# Patient Record
Sex: MALE | Race: WHITE | NOT HISPANIC OR LATINO | Employment: UNEMPLOYED | URBAN - METROPOLITAN AREA
[De-identification: names, ages, dates, MRNs, and addresses within clinical notes are randomized per-mention and may not be internally consistent; named-entity substitution may affect disease eponyms.]

---

## 2018-02-15 ENCOUNTER — HOSPITAL ENCOUNTER (EMERGENCY)
Facility: HOSPITAL | Age: 28
Discharge: HOME | End: 2018-02-15
Attending: EMERGENCY MEDICINE | Admitting: EMERGENCY MEDICINE
Payer: COMMERCIAL

## 2018-02-15 ENCOUNTER — APPOINTMENT (EMERGENCY)
Dept: CT IMAGING | Facility: HOSPITAL | Age: 28
End: 2018-02-15
Payer: COMMERCIAL

## 2018-02-15 VITALS
DIASTOLIC BLOOD PRESSURE: 64 MMHG | RESPIRATION RATE: 20 BRPM | HEART RATE: 103 BPM | TEMPERATURE: 97.5 F | WEIGHT: 195 LBS | SYSTOLIC BLOOD PRESSURE: 126 MMHG | OXYGEN SATURATION: 100 %

## 2018-02-15 DIAGNOSIS — K59.03 DRUG-INDUCED CONSTIPATION: Primary | ICD-10-CM

## 2018-02-15 LAB
ALBUMIN SERPL BCP-MCNC: 4.6 G/DL (ref 3.5–5)
ALP SERPL-CCNC: 78 U/L (ref 46–116)
ALT SERPL W P-5'-P-CCNC: 45 U/L (ref 12–78)
AMPHETAMINES SERPL QL SCN: NEGATIVE
ANION GAP SERPL CALCULATED.3IONS-SCNC: 11 MMOL/L (ref 4–13)
AST SERPL W P-5'-P-CCNC: 30 U/L (ref 5–45)
BACTERIA UR QL AUTO: ABNORMAL /HPF
BARBITURATES UR QL: NEGATIVE
BASOPHILS # BLD AUTO: 0.04 THOUSANDS/ΜL (ref 0–0.1)
BASOPHILS NFR BLD AUTO: 0 % (ref 0–1)
BENZODIAZ UR QL: NEGATIVE
BILIRUB SERPL-MCNC: 0.6 MG/DL (ref 0.2–1)
BILIRUB UR QL STRIP: NEGATIVE
BUN SERPL-MCNC: 18 MG/DL (ref 5–25)
CALCIUM SERPL-MCNC: 9.3 MG/DL (ref 8.3–10.1)
CHLORIDE SERPL-SCNC: 103 MMOL/L (ref 100–108)
CLARITY UR: CLEAR
CO2 SERPL-SCNC: 27 MMOL/L (ref 21–32)
COCAINE UR QL: NEGATIVE
COLOR UR: YELLOW
CREAT SERPL-MCNC: 1.17 MG/DL (ref 0.6–1.3)
EOSINOPHIL # BLD AUTO: 0.31 THOUSAND/ΜL (ref 0–0.61)
EOSINOPHIL NFR BLD AUTO: 2 % (ref 0–6)
ERYTHROCYTE [DISTWIDTH] IN BLOOD BY AUTOMATED COUNT: 13.4 % (ref 11.6–15.1)
GFR SERPL CREATININE-BSD FRML MDRD: 84 ML/MIN/1.73SQ M
GLUCOSE SERPL-MCNC: 88 MG/DL (ref 65–140)
GLUCOSE UR STRIP-MCNC: NEGATIVE MG/DL
HCT VFR BLD AUTO: 34.7 % (ref 36.5–49.3)
HGB BLD-MCNC: 11.9 G/DL (ref 12–17)
HGB UR QL STRIP.AUTO: ABNORMAL
KETONES UR STRIP-MCNC: NEGATIVE MG/DL
LEUKOCYTE ESTERASE UR QL STRIP: ABNORMAL
LIPASE SERPL-CCNC: 89 U/L (ref 73–393)
LYMPHOCYTES # BLD AUTO: 5.22 THOUSANDS/ΜL (ref 0.6–4.47)
LYMPHOCYTES NFR BLD AUTO: 32 % (ref 14–44)
MCH RBC QN AUTO: 30.7 PG (ref 26.8–34.3)
MCHC RBC AUTO-ENTMCNC: 34.3 G/DL (ref 31.4–37.4)
MCV RBC AUTO: 90 FL (ref 82–98)
METHADONE UR QL: POSITIVE
MONOCYTES # BLD AUTO: 1.52 THOUSAND/ΜL (ref 0.17–1.22)
MONOCYTES NFR BLD AUTO: 9 % (ref 4–12)
NEUTROPHILS # BLD AUTO: 9.27 THOUSANDS/ΜL (ref 1.85–7.62)
NEUTS SEG NFR BLD AUTO: 57 % (ref 43–75)
NITRITE UR QL STRIP: NEGATIVE
NON-SQ EPI CELLS URNS QL MICRO: ABNORMAL /HPF
OPIATES UR QL SCN: NEGATIVE
PCP UR QL: NEGATIVE
PH UR STRIP.AUTO: 6 [PH] (ref 4.5–8)
PLATELET # BLD AUTO: 351 THOUSANDS/UL (ref 149–390)
PMV BLD AUTO: 9.3 FL (ref 8.9–12.7)
POTASSIUM SERPL-SCNC: 3.9 MMOL/L (ref 3.5–5.3)
PROT SERPL-MCNC: 7.7 G/DL (ref 6.4–8.2)
PROT UR STRIP-MCNC: ABNORMAL MG/DL
RBC # BLD AUTO: 3.87 MILLION/UL (ref 3.88–5.62)
RBC #/AREA URNS AUTO: ABNORMAL /HPF
SODIUM SERPL-SCNC: 141 MMOL/L (ref 136–145)
SP GR UR STRIP.AUTO: 1.02 (ref 1–1.03)
THC UR QL: NEGATIVE
UROBILINOGEN UR QL STRIP.AUTO: 0.2 E.U./DL
WBC # BLD AUTO: 16.36 THOUSAND/UL (ref 4.31–10.16)
WBC #/AREA URNS AUTO: ABNORMAL /HPF

## 2018-02-15 PROCEDURE — 96374 THER/PROPH/DIAG INJ IV PUSH: CPT

## 2018-02-15 PROCEDURE — 36415 COLL VENOUS BLD VENIPUNCTURE: CPT | Performed by: EMERGENCY MEDICINE

## 2018-02-15 PROCEDURE — 99284 EMERGENCY DEPT VISIT MOD MDM: CPT

## 2018-02-15 PROCEDURE — 74177 CT ABD & PELVIS W/CONTRAST: CPT

## 2018-02-15 PROCEDURE — 80053 COMPREHEN METABOLIC PANEL: CPT | Performed by: EMERGENCY MEDICINE

## 2018-02-15 PROCEDURE — 83690 ASSAY OF LIPASE: CPT | Performed by: EMERGENCY MEDICINE

## 2018-02-15 PROCEDURE — 81001 URINALYSIS AUTO W/SCOPE: CPT

## 2018-02-15 PROCEDURE — 80307 DRUG TEST PRSMV CHEM ANLYZR: CPT | Performed by: EMERGENCY MEDICINE

## 2018-02-15 PROCEDURE — 85025 COMPLETE CBC W/AUTO DIFF WBC: CPT | Performed by: EMERGENCY MEDICINE

## 2018-02-15 PROCEDURE — 96361 HYDRATE IV INFUSION ADD-ON: CPT

## 2018-02-15 RX ORDER — DIAZEPAM 5 MG/ML
5 INJECTION, SOLUTION INTRAMUSCULAR; INTRAVENOUS ONCE
Status: COMPLETED | OUTPATIENT
Start: 2018-02-15 | End: 2018-02-15

## 2018-02-15 RX ORDER — DICYCLOMINE HCL 20 MG
20 TABLET ORAL ONCE
Status: COMPLETED | OUTPATIENT
Start: 2018-02-15 | End: 2018-02-15

## 2018-02-15 RX ADMIN — DIAZEPAM 5 MG: 5 INJECTION, SOLUTION INTRAMUSCULAR; INTRAVENOUS at 18:25

## 2018-02-15 RX ADMIN — DICYCLOMINE HYDROCHLORIDE 20 MG: 20 TABLET ORAL at 18:25

## 2018-02-15 RX ADMIN — SODIUM CHLORIDE 1000 ML: 0.9 INJECTION, SOLUTION INTRAVENOUS at 18:25

## 2018-02-15 RX ADMIN — IOHEXOL 100 ML: 350 INJECTION, SOLUTION INTRAVENOUS at 19:21

## 2018-02-15 NOTE — ED PROVIDER NOTES
History  Chief Complaint   Patient presents with    Constipation     Patient reports constipation and RLQ and LLQ pain for the last 2 days  Very anxious in triage and unable to sit still  History provided by:  Patient   used: No     68-year-old male presents seemingly distressed with acute abdominal pain  Notes that it hurts in both the right lower quadrant in the left lower quadrant and seem to come on acutely about 4 hours ago  He had somewhat similar pain a few days ago and was evaluated previously [facility]   Notes he had a CT scan remarkable for constipation  States that he had bowel movements Monday, Tuesday, Wednesday but they were has big as usual   No blood in the stool  No vomiting  Today he developed the acute abdominal pain after trying to have a bowel movement for most of the day  He is pacing around the room, crying and clutching his abdomen  Vitals are unremarkable  Difficult to examine due to patient distress but his abdomen seems tense and diffusely tender  Differential diagnosis includes constipation, bowel perforation, diverticulitis, appendicitis, ureteral stone  Will repeat CT given new, acute and distressing pain check labs and temp pain control  Pain does seem out of proportion to complaint and exam   Also possibility of malingering  [place] of [place] drug [place] shows a history of methadone use but not over the last 10 months  None       History reviewed  No pertinent past medical history  History reviewed  No pertinent surgical history  History reviewed  No pertinent family history  I have reviewed and agree with the history as documented  Social History   Substance Use Topics    Smoking status: Never Smoker    Smokeless tobacco: Not on file    Alcohol use No        Review of Systems   Constitutional: Negative for activity change, appetite change and fever  Respiratory: Negative for chest tightness and shortness of breath  Gastrointestinal: Positive for abdominal pain and constipation  Negative for nausea and vomiting  Musculoskeletal: Negative for back pain and neck pain  Skin: Negative for color change and wound  All other systems reviewed and are negative  Physical Exam  ED Triage Vitals   Temperature Pulse Respirations Blood Pressure SpO2   02/15/18 1643 02/15/18 1642 02/15/18 1642 02/15/18 1642 02/15/18 1642   97 5 °F (36 4 °C) 100 20 142/100 99 %      Temp Source Heart Rate Source Patient Position - Orthostatic VS BP Location FiO2 (%)   02/15/18 1643 02/15/18 1642 02/15/18 1642 02/15/18 1642 --   Oral Monitor Sitting Left arm       Pain Score       --                  Orthostatic Vital Signs  Vitals:    02/15/18 1642   BP: 142/100   Pulse: 100   Patient Position - Orthostatic VS: Sitting       Physical Exam   Constitutional: He is oriented to person, place, and time  He appears well-developed and well-nourished  He appears distressed  Patient around room, unable to sit still, crying and voice cracking  HENT:   Head: Normocephalic  Mouth/Throat: Oropharynx is clear and moist    Cardiovascular: Normal rate and regular rhythm  Pulmonary/Chest: Effort normal  No respiratory distress  Abdominal:   Tense, diffusely tender  Patient voluntarily guarding  Musculoskeletal: Normal range of motion  He exhibits no edema  Neurological: He is alert and oriented to person, place, and time  Skin: Skin is warm and dry  Nursing note and vitals reviewed        ED Medications  Medications   diazepam (VALIUM) injection 5 mg (5 mg Intravenous Given 2/15/18 1825)   dicyclomine (BENTYL) tablet 20 mg (20 mg Oral Given 2/15/18 1825)   sodium chloride 0 9 % bolus 1,000 mL (1,000 mL Intravenous New Bag 2/15/18 1825)   iohexol (OMNIPAQUE) 350 MG/ML injection (MULTI-DOSE) 100 mL (100 mL Intravenous Given 2/15/18 1921)       Diagnostic Studies  Results Reviewed     Procedure Component Value Units Date/Time    Urine Microscopic [[id number]] Collected:  02/15/18 1916    Lab Status: In process Specimen:  Urine Updated:  02/15/18 1953    Rapid drug screen, urine [[id number]]  (Abnormal) Collected:  02/15/18 1911    Lab Status:  Final result Specimen:  Urine from Urine, Clean Catch Updated:  02/15/18 1931     Amph/Meth UR Negative     Barbiturate Ur Negative     Benzodiazepine Urine Negative     Cocaine Urine Negative     Methadone Urine Positive (A)     Opiate Urine Negative     PCP Ur Negative     THC Urine Negative    Narrative:         Presumptive report  If requested, specimen will be sent to reference lab for confirmation  FOR MEDICAL PURPOSES ONLY  IF CONFIRMATION NEEDED PLEASE CONTACT THE LAB WITHIN 5 DAYS      Drug Screen Cutoff Levels:  AMPHETAMINE/METHAMPHETAMINES  1000 ng/mL  BARBITURATES     200 ng/mL  BENZODIAZEPINES     200 ng/mL  COCAINE      300 ng/mL  METHADONE      300 ng/mL  OPIATES      300 ng/mL  PHENCYCLIDINE     25 ng/mL  THC       50 ng/mL    ED Urine Macroscopic [[id number]]  (Abnormal) Collected:  02/15/18 1916    Lab Status:  Final result Specimen:  Urine Updated:  02/15/18 1916     Color, UA Yellow     Clarity, UA Clear     pH, UA 6 0     Leukocytes, UA Trace (A)     Nitrite, UA Negative     Protein, UA 30 (1+) (A) mg/dl      Glucose, UA Negative mg/dl      Ketones, UA Negative mg/dl      Urobilinogen, UA 0 2 E U /dl      Bilirubin, UA Negative     Blood, UA Small (A)     Specific [place], UA 1 025    Narrative:       CLINITEK RESULT    Comprehensive metabolic panel [[id number]] Collected:  02/15/18 1823    Lab Status:  Final result Specimen:  Blood from Arm, Right Updated:  02/15/18 1847     Sodium 141 mmol/L      Potassium 3 9 mmol/L      Chloride 103 mmol/L      CO2 27 mmol/L      Anion Gap 11 mmol/L      BUN 18 mg/dL      Creatinine 1 17 mg/dL      Glucose 88 mg/dL      Calcium 9 3 mg/dL      AST 30 U/L      ALT 45 U/L      Alkaline Phosphatase 78 U/L      Total Protein 7 7 g/dL      Albumin 4 6 g/dL      Total Bilirubin 0 60 mg/dL      eGFR 84 ml/min/1 73sq m     Narrative:         National Kidney Disease Education Program recommendations are as follows:  GFR calculation is accurate only with a steady state creatinine  Chronic Kidney disease less than 60 ml/min/1 73 sq  meters  Kidney failure less than 15 ml/min/1 73 sq  meters  Lipase [[id number]]  (Normal) Collected:  02/15/18 1823    Lab Status:  Final result Specimen:  Blood from Arm, Right Updated:  02/15/18 1847     Lipase 89 u/L     CBC and differential [[id number]]  (Abnormal) Collected:  02/15/18 1823    Lab Status:  Final result Specimen:  Blood from Arm, Right Updated:  02/15/18 1833     WBC 16 36 (H) Thousand/uL      RBC 3 87 (L) Million/uL      Hemoglobin 11 9 (L) g/dL      Hematocrit 34 7 (L) %      MCV 90 fL      MCH 30 7 pg      MCHC 34 3 g/dL      RDW 13 4 %      MPV 9 3 fL      Platelets 870 Thousands/uL      Neutrophils Relative 57 %      Lymphocytes Relative 32 %      Monocytes Relative 9 %      Eosinophils Relative 2 %      Basophils Relative 0 %      Neutrophils Absolute 9 27 (H) Thousands/µL      Lymphocytes Absolute 5 22 (H) Thousands/µL      Monocytes Absolute 1 52 (H) Thousand/µL      Eosinophils Absolute 0 31 Thousand/µL      Basophils Absolute 0 04 Thousands/µL                  CT abdomen pelvis with contrast   Final Result by Juan Quintero MD (02/15 1935)      Constipation  There is also some fluid in the colon surrounding large quantity of formed stool  No other significant findings            Workstation performed: [id number]                    Procedures  Procedures       Phone Contacts  ED Phone Contact    ED Course  ED Course                                MDM  Number of Diagnoses or Management Options  Drug-induced constipation:   Diagnosis management comments: 15-year-old male presents for evaluation of worsening lower abdominal pain likely associated with constipation  History of constipation earlier this week    Has been using magnesium citrate producing slight bowel movements but not relieving his overall pain  Appears very uncomfortable on exam with voluntary guarding and diffuse tenderness  CT notable for constipation without complication  Patient does use methadone daily, 95 mg  This is likely the etiology  OTC meds have not helped  No stool in the rectum or distal sigmoid  Enema unlikely to help  Will give GoLYTELY bowel prep and advised him to continue bowel regimen at home  Amount and/or Complexity of Data Reviewed  Clinical lab tests: ordered and reviewed  Tests in the radiology section of CPT®: ordered and reviewed    Patient Progress  Patient progress: stable    CritCare Time    Disposition  Final diagnoses:   Drug-induced constipation     Time reflects when diagnosis was documented in both MDM as applicable and the Disposition within this note     Time User Action Codes Description Comment    2/15/2018  7:57 PM Huey LANDEROS Add [K59 03] Drug-induced constipation       ED Disposition     ED Disposition Condition Comment    Discharge  Rashelkimmie Kasey Verduzco discharge to home/self care  Condition at discharge: Stable        Follow-up Information     Follow up With Specialties Details Why Contact Info Additional [address] Emergency Department Emergency Medicine  If symptoms worsen [address] [address]  [phone] AN ED,  [address] [place] [address], [phone]    Tanna Castillo MD Gastroenterology   [address] [place] [address]  [facility] [place] [facility]  [phone]           Patient's Medications   Discharge Prescriptions    POLYETHYLENE GLYCOL (GOLYTELY) 4000 ML SOLUTION    Take 4,000 mL by mouth once for 1 dose 8oz every 30 minutes until stools are clear       Start Date: 2/15/2018 End Date: 2/15/2018       Order Dose: 4,000 mL       Quantity: 4000 mL    Refills: 0     No discharge procedures on file      ED Provider  Electronically Signed by           Alexandra Gayle MD  02/15/18 2003

## 2018-02-16 NOTE — DISCHARGE INSTRUCTIONS
Constipation   WHAT YOU NEED TO KNOW:   Constipation is when you have hard, dry bowel movements, or you go longer than usual between bowel movements  DISCHARGE INSTRUCTIONS:   Return to the emergency department if:   · You have blood in your bowel movements  · You have a fever and abdominal pain with the constipation  Contact your healthcare provider if:   · Your constipation gets worse  · You start to vomit  · You have questions or concerns about your condition or care  Medicines:   · Medicine or a fiber supplement  may help make your bowel movement softer  A laxative may help relax and loosen your intestines to help you have a bowel movement  You may also be given medicine to increase fluid in your intestines  The fluid may help move bowel movements through your intestines  · Take your medicine as directed  Contact your healthcare provider if you think your medicine is not helping or if you have side effects  Tell him of her if you are allergic to any medicine  Keep a list of the medicines, vitamins, and herbs you take  Include the amounts, and when and why you take them  Bring the list or the pill bottles to follow-up visits  Carry your medicine list with you in case of an emergency  Manage your constipation:   · Drink liquids as directed  You may need to drink extra liquids to help soften and move your bowels  Ask how much liquid to drink each day and which liquids are best for you  · Eat high-fiber foods  This may help decrease constipation by adding bulk to your bowel movements  High-fiber foods include fruit, vegetables, whole-grain breads and cereals, and beans  Your healthcare provider or dietitian can help you create a high-fiber meal plan  · Exercise regularly  Regular physical activity can help stimulate your intestines  Ask which exercises are best for you  · Schedule a time each day to have a bowel movement    This may help train your body to have regular bowel movements  Bend forward while you are on the toilet to help move the bowel movement out  Sit on the toilet for at least 10 minutes, even if you do not have a bowel movement  Follow up with your healthcare provider as directed:  Write down your questions so you remember to ask them during your visits  © 2017 [facility] Information is for End User's use only and may not be sold, redistributed or otherwise used for commercial purposes  All illustrations and images included in CareNotes® are the copyrighted property of [employer]  or [address]  The above information is an  only  It is not intended as medical advice for individual conditions or treatments  Talk to your doctor, nurse or pharmacist before following any medical regimen to see if it is safe and effective for you

## 2018-06-25 ENCOUNTER — HOSPITAL ENCOUNTER (EMERGENCY)
Facility: HOSPITAL | Age: 28
End: 2018-06-26
Attending: EMERGENCY MEDICINE
Payer: COMMERCIAL

## 2018-06-25 DIAGNOSIS — F32.A DEPRESSION: Primary | ICD-10-CM

## 2018-06-25 LAB — ETHANOL EXG-MCNC: 0 MG/DL

## 2018-06-25 PROCEDURE — 80307 DRUG TEST PRSMV CHEM ANLYZR: CPT | Performed by: EMERGENCY MEDICINE

## 2018-06-25 PROCEDURE — 82075 ASSAY OF BREATH ETHANOL: CPT | Performed by: EMERGENCY MEDICINE

## 2018-06-26 VITALS
SYSTOLIC BLOOD PRESSURE: 118 MMHG | RESPIRATION RATE: 18 BRPM | HEART RATE: 68 BPM | TEMPERATURE: 98.2 F | OXYGEN SATURATION: 98 % | HEIGHT: 67 IN | DIASTOLIC BLOOD PRESSURE: 73 MMHG

## 2018-06-26 LAB
AMPHETAMINES SERPL QL SCN: NEGATIVE
BARBITURATES UR QL: NEGATIVE
BENZODIAZ UR QL: NEGATIVE
COCAINE UR QL: NEGATIVE
METHADONE UR QL: POSITIVE
OPIATES UR QL SCN: NEGATIVE
PCP UR QL: NEGATIVE
THC UR QL: POSITIVE

## 2018-06-26 PROCEDURE — 99285 EMERGENCY DEPT VISIT HI MDM: CPT

## 2018-06-26 RX ORDER — NICOTINE 21 MG/24HR
21 PATCH, TRANSDERMAL 24 HOURS TRANSDERMAL DAILY
Status: DISCONTINUED | OUTPATIENT
Start: 2018-06-26 | End: 2018-06-26 | Stop reason: HOSPADM

## 2018-06-26 RX ORDER — METHADONE HYDROCHLORIDE 10 MG/5ML
95 SOLUTION ORAL DAILY
COMMUNITY

## 2018-06-26 RX ORDER — METHADONE HYDROCHLORIDE 10 MG/ML
95 CONCENTRATE ORAL ONCE
Status: COMPLETED | OUTPATIENT
Start: 2018-06-26 | End: 2018-06-26

## 2018-06-26 RX ADMIN — METHADONE HYDROCHLORIDE 95 MG: 10 CONCENTRATE ORAL at 09:23

## 2018-06-26 RX ADMIN — NICOTINE 21 MG: 21 PATCH, EXTENDED RELEASE TRANSDERMAL at 10:35

## 2018-06-26 NOTE — ED NOTES
CM received call from Jackie Blake at [employer]; [facility] Divine will transport patient at 7:00 pm  Medical necessity was already faxed  CM completed EMTALA  CM informed patient, attending, charge RN and [place] of transport time and all in agreement  As per Yang's request, CM did request that attending make progress note re: patient receiving Methadone dose this morning at the hospital and attending agreed to do so  Patient does not appear to have any other needs at this time  CM will continue to follow through discharge

## 2018-06-26 NOTE — ED NOTES
RN called to pt  Room  Pt  Takes Methadone 95 mg daily  Updated med rec and will speak to Dr Mirna Gaines, IRENE  06/26/18 4570

## 2018-06-26 NOTE — ED NOTES
CM called [facility] and asked for letter to be sent to [facility] with requested information  CM was informed that [facility] would do so once patient signed letter of release that [facility] agreed to fax over  CM met with patient to have him sign release of information faxed over by [facility]  Patient agreed to sign release without concern  CM faxed release of information to [facility]  CM called [facility] ([phone]) and spoke with [facility] to provide update on requested information  [facility] stated that patient will be accepted upon receipt of letter from [facility] will call  once patient is accepted and CM can arrange for transport  CM will complete medical necessity form and place on chart so that it is ready for transport scheduling later  CM called security and informed them that patient's car will remain while in treatment  Security stated that as long as patient is in a parking spot, his car will remain untouched on campus  CM will continue to follow through discharge

## 2018-06-26 NOTE — ED NOTES
Pt resting in bed with TV on and lights off  No signs of distress at this time  Will continue to monitor pt        Wendy Bonilla  06/26/18 5622

## 2018-06-26 NOTE — ED NOTES
CM received call from [place] [address] admissions asking for some follow up information on patient  They are requesting: current vitals from today, clarification on current medications and clarification on any medical diagnoses  [place] [address] is also requesting a letter from patient's methadone clinic with the following information: 1  Current dose, 2  Date last seen and 3  Confirmation that they will be willing to accept patient back once discharged from [place] [address]  CM will meet with patient to gather information, as well as charge RN, and will relay information to [place] [address]  CM will also contact the methadone clinic  CM will continue to follow through discharge

## 2018-06-26 NOTE — ED NOTES
PT is in room  Door is closed, due to EVS cleaning [facility] F[facility]ILITY area and bathroom  PT agreed to close door, and there is no sign of distress  We will continue to monitor        [facility]  06/26/18 9098

## 2018-06-26 NOTE — ED NOTES
LATESHA reached out to [employer] ([phone]  [phone]) and spoke with Arnel Ybarra CM stated she was calling to confirm that patient's signed release of information was received  Arnel Ybarra stated that they did receive the release and that patient's counselor is currently in a meeting but that staff has left her an e-mail and a voicemail requesting that documentation be sent to Razia Fonseca CM stated that she appreciated the help and would wait to hear back from Razia Fonseca re: acceptance  CM will continue to follow through discharge

## 2018-06-26 NOTE — ED NOTES
[facility] called stating that we can fax over pt's clinical to [facility]  KURT Yepez to let her know          Mylene Zarate  06/26/18 0252

## 2018-06-26 NOTE — ED PROVIDER NOTES
History  Chief Complaint   Patient presents with    Psychiatric Evaluation     pt with increased depression and suicidal thoughts  no plan  pt was seeing outpatient therapy and has had increased stressors  History provided by:  Patient   used: No    Psychiatric Evaluation   Presenting symptoms: suicidal thoughts    Associated symptoms: no abdominal pain, no chest pain and no headaches      Patient is a 25-year-old male presenting to emergency department depression and suicidal thoughts  Getting worse and last 10 days  No plan  No HI  Does state gets angry easier  No hallucinations  Former , not diagnosed PTSD but states he feels like he has some of the symptoms  History of depression  He has pain medications, nausea medications for 6 months  History of heroin abuse, now on methadone, last heroin use multiple years ago, use marijuana daily, use today  MDM check alcohol, UDS, crisis consult, patient agreeable with signing in for help        Prior to Admission Medications   Prescriptions Last Dose Informant Patient Reported? Taking? methadone (DOLOPHINE) 10 MG/5ML solution   Yes Yes   Sig: Take 95 mg by mouth daily      Facility-Administered Medications: None       Past Medical History:   Diagnosis Date    ADD (attention deficit disorder)     ADHD (attention deficit hyperactivity disorder)     Anxiety     Depression     Psychiatric disorder     depression       History reviewed  No pertinent surgical history  History reviewed  No pertinent family history  I have reviewed and agree with the history as documented  Social History   Substance Use Topics    Smoking status: Never Smoker    Smokeless tobacco: Never Used    Alcohol use No        Review of Systems   Constitutional: Negative for chills, diaphoresis and fever  Respiratory: Negative for cough, shortness of breath, wheezing and stridor      Cardiovascular: Negative for chest pain, palpitations and leg swelling  Gastrointestinal: Negative for abdominal pain, blood in stool, diarrhea, nausea and vomiting  Genitourinary: Negative for dysuria, frequency and urgency  Musculoskeletal: Negative for neck stiffness  Skin: Negative for pallor and rash  Neurological: Negative for dizziness, syncope, weakness, light-headedness and headaches  Psychiatric/Behavioral: Positive for suicidal ideas  All other systems reviewed and are negative  Physical Exam  Physical Exam   Constitutional: He is oriented to person, place, and time  He appears well-developed and well-nourished  HENT:   Head: Normocephalic and atraumatic  Eyes: Pupils are equal, round, and reactive to light  Neck: Neck supple  Cardiovascular: Normal rate, regular rhythm, normal heart sounds and intact distal pulses  Pulmonary/Chest: Effort normal and breath sounds normal  No respiratory distress  Abdominal: Soft  Bowel sounds are normal  There is no tenderness  Musculoskeletal: Normal range of motion  He exhibits no edema or tenderness  Neurological: He is alert and oriented to person, place, and time  Skin: Skin is warm and dry  Capillary refill takes less than 2 seconds  No erythema  Vitals reviewed        Vital Signs  ED Triage Vitals   Temperature Pulse Respirations Blood Pressure SpO2   06/25/18 2346 06/25/18 2312 06/25/18 2312 06/25/18 2312 06/25/18 2312   98 2 °F (36 8 °C) 82 20 142/72 98 %      Temp Source Heart Rate Source Patient Position - Orthostatic VS BP Location FiO2 (%)   06/25/18 2346 06/26/18 0916 06/26/18 0916 06/26/18 0916 --   Oral Monitor Sitting Left arm       Pain Score       06/25/18 2312       No Pain           Vitals:    06/25/18 2312 06/26/18 0916 06/26/18 1501   BP: 142/72 132/79 118/73   Pulse: 82 73 68   Patient Position - Orthostatic VS:  Sitting Lying       Visual Acuity      ED Medications  Medications   methadone (DOLOPHINE) 10 mg/mL oral concentrated solution 95 mg (95 mg Oral Given 6/26/18 0923)       Diagnostic Studies  Results Reviewed     Procedure Component Value Units Date/Time    Rapid drug screen, urine [[id number]]  (Abnormal) Collected:  06/25/18 2347    Lab Status:  Final result Specimen:  Urine from Urine, Clean Catch Updated:  06/26/18 0002     Amph/Meth UR Negative     Barbiturate Ur Negative     Benzodiazepine Urine Negative     Cocaine Urine Negative     Methadone Urine Positive (A)     Opiate Urine Negative     PCP Ur Negative     THC Urine Positive (A)    Narrative:         Presumptive report  If requested, specimen will be sent to reference lab for confirmation  FOR MEDICAL PURPOSES ONLY  IF CONFIRMATION NEEDED PLEASE CONTACT THE LAB WITHIN 5 DAYS      Drug Screen Cutoff Levels:  AMPHETAMINE/METHAMPHETAMINES  1000 ng/mL  BARBITURATES     200 ng/mL  BENZODIAZEPINES     200 ng/mL  COCAINE      300 ng/mL  METHADONE      300 ng/mL  OPIATES      300 ng/mL  PHENCYCLIDINE     25 ng/mL  THC       50 ng/mL    POCT alcohol breath test [[id number]]  (Normal) Resulted:  06/25/18 2345    Lab Status:  Final result Updated:  06/25/18 2346     EXTBreath Alcohol 0                 No orders to display              Procedures  Procedures       Phone Contacts  ED Phone Contact    ED Course                               MDM  CritCare Time    Disposition  Final diagnoses:   Depression     Time reflects when diagnosis was documented in both MDM as applicable and the Disposition within this note     Time User Action Codes Description Comment    6/26/2018  1:04 PM Merlin Barlow Add [F32 9] Depression       ED Disposition     ED Disposition Condition Comment    Transfer to [address]        MD Documentation      Most Recent Value   Patient Condition  Other (Include comment)_________________________________________ [201]   Reason for Transfer  Level of Care needed not available at this facility, Other (Include comment)____________________ [201]   Benefits of Transfer  Other benefits (Include comment)_______________________ [201]   Risks of Transfer  Possible worsening of condition or death during transfer, Other: (Include comment)__________________________ [201]   Accepting Physician  Dr Aaron Bradley Name, [address] [place] [address]  Z:[phone]  L:[phone]    Transported by [employer] and Unit #)  [facility] at 7:00 pm   Sending MD Dr Lili Lawrence      RN Documentation      Most [address] [place] [address] Name, [address] [place] [address]  [phone]  B:[phone]    [facility]  [facility] at 7:00 pm      Follow-up Information    None         Discharge Medication List as of 6/26/2018  7:34 PM      CONTINUE these medications which have NOT CHANGED    Details   methadone (DOLOPHINE) 10 MG/5ML solution Take 95 mg by mouth daily, Historical Med           No discharge procedures on file      ED Provider  Electronically Signed by           Misa Pappas MD  06/28/18 9313

## 2018-06-26 NOTE — ED NOTES
SORAYA Shelby taking over visual observation from [facility] ([facility])       Matheus Chavez  06/26/18 2051

## 2018-06-26 NOTE — ED NOTES
Pt on bed sleeping with light off and TV on  Will continue to monitor pt        Sohan Jean  06/26/18 0219

## 2018-06-26 NOTE — ED NOTES
Pt sitting on chair playing his nintendo  Light on  Will continue to monitor pt        Patrizia Jean  06/26/18 0004

## 2018-06-26 NOTE — ED NOTES
Pt appears to be resting on bed  Lights off  TV off  No complaints  Will continue to monitor       Jennifer Leon  06/26/18 0606

## 2018-06-26 NOTE — ED NOTES
Ds took over observation   the patient lying on bed, lights off, tv on, door slightly open     [facility]  06/26/18 5721

## 2018-06-26 NOTE — ED NOTES
LATESHA received voicemail from Zane Marshall at [facility] ([phone]) asking for an update on patient's bed search  CM informed Zane Marshall that he is being reviewed at [facility]  Zane Marshall asked that CM call once patient is accepted so they can call off their bed search efforts

## 2018-06-26 NOTE — ED NOTES
Pt appears to be resting in bed  Lights off  TV on  No complaints  Will continue to monitor       Terra Cresencio  06/26/18 9238

## 2018-06-26 NOTE — ED NOTES
Pt appears to be resting in bed  Lights off  TV on  No complaints  Will continue to visual monitor        [facility]  06/26/18 8974

## 2018-06-26 NOTE — ED NOTES
Pt appears to be resting in bed  Lights off  TV off  No complaints  Will continue to monitor       Carmina Burciaga  06/26/18 5660

## 2018-06-26 NOTE — ED NOTES
Pt appears to be sitting on bed playing [employer]  Lights off  TV off  No complaints  Will continue to monitor       Antonietta Lainez  06/26/18 2373

## 2018-06-26 NOTE — ED NOTES
CM met with patient to request information being requested for [facility] admission review  Patient stated that he receives Methadone from [facility] in [place], [place]  Patient denies that he is on any other medications at this time and stated that he is only on Methadone  Patient denies any medical record history and states that he only has a mental health history and is diagnosed with depression, anxiety and ADD  Patient stated that his car is in the parking lot and he does not have anyone who can come and pick it up for him  CM stated that she would inform security  CM stated that she will reach out to [facility] to get treatment confirmation and will also follow up with [facility]  CM will follow patient through discharge

## 2018-06-26 NOTE — ED NOTES
Verbal report given to [address]   Aicha Monteroy taking over visual observation from ED [employer]       [facility]  06/26/18 4008

## 2018-06-26 NOTE — ED NOTES
Patient received food iker [facility]  06/26/18 860 [facility]  06/26/18 0147

## 2018-06-26 NOTE — EMTALA/ACUTE CARE TRANSFER
[address] [address] [address]  Dept: [phone]      EMTALA TRANSFER CONSENT    NAME Orly Verduzco                                         1990                              MRN [id number]    I have been informed of my rights regarding examination, treatment, and transfer   by Dr Margaux Cazares DO    Benefits: Other benefits (Include comment)_______________________ [phone])    Risks: Possible worsening of condition or death during transfer, Other: (Include comment)__________________________ [phone])      Consent for Transfer:  I acknowledge that my medical condition has been evaluated and explained to me by the emergency department physician or other qualified medical person and/or my attending physician, who has recommended that I be transferred to the service of  Accepting Physician: Dr Tony Villeda at [address] [place] [address] Name, [address] : [facility]  I:[phone]  E:[phone]   The above potential benefits of such transfer, the potential risks associated with such transfer, and the probable risks of not being transferred have been explained to me, and I fully understand them  The doctor has explained that, in my case, the benefits of transfer outweigh the risks  I agree to be transferred  I authorize the performance of emergency medical procedures and treatments upon me in both transit and upon arrival at the receiving facility  Additionally, I authorize the release of any and all medical records to the receiving facility and request they be transported with me, if possible  I understand that the safest mode of transportation during a medical emergency is an ambulance and that the Hospital advocates the use of this mode of transport   Risks of traveling to the receiving facility by car, including absence of medical control, life sustaining equipment, such as oxygen, and medical personnel has been explained to me and I fully understand them  (QUINTON CORRECT BOX BELOW)  [  ]  I consent to the stated transfer and to be transported by ambulance/helicopter  [  ]  I consent to the stated transfer, but refuse transportation by ambulance and accept full responsibility for my transportation by car  I understand the risks of non-ambulance transfers and I exonerate the Hospital and its staff from any deterioration in my condition that results from this refusal     X___________________________________________    DATE  18  TIME________  Signature of patient or legally responsible individual signing on patient behalf           RELATIONSHIP TO PATIENT_________________________          Provider Certification    NAME Renetta Verduzco                                         1990                              MRN [id number]    A medical screening exam was performed on the above named patient  Based on the examination:    Condition Necessitating Transfer The encounter diagnosis was Depression  Patient Condition: Other (Include comment)_________________________________________ (201)    Reason for Transfer: Level of Care needed not available at this facility, Other (Include comment)____________________ [phone])    Transfer Requirements: [address]  T:[phone]  N:[phone]    · Space available and qualified personnel available for treatment as acknowledged by    · Agreed to accept transfer and to provide appropriate medical treatment as acknowledged by       Dr Kelly Mena  · Appropriate medical records of the examination and treatment of the patient are provided at the time of transfer   [address] _______  · Transfer will be performed by qualified personnel from [facility] at 7:00 pm  and appropriate transfer equipment as required, including the use of necessary and appropriate life support measures      Provider Certification: I have examined the patient and explained the following risks and benefits of being transferred/refusing transfer to the patient/family:         Based on these reasonable risks and benefits to the patient and/or the unborn child(tyrese), and based upon the information available at the time of the patients examination, I certify that the medical benefits reasonably to be expected from the provision of appropriate medical treatments at another medical facility outweigh the increasing risks, if any, to the individuals medical condition, and in the case of labor to the unborn child, from effecting the transfer      X____________________________________________ DATE 06/26/18        TIME_______      ORIGINAL - SEND TO MEDICAL RECORDS   COPY - SEND WITH PATIENT DURING TRANSFER

## 2018-06-26 NOTE — ED NOTES
CM received a call from [facility] admissions and they are currently reviewing patient  Admissions will call back and inform us of the information  CM will follow

## 2018-06-26 NOTE — ED NOTES
Phone call placed to [address] [place] [address] [facility] for verification that pt  Is a client and dose of methadone  Awaiting call back from nursing staff        Dulce Maria Lafleur RN  06/26/18 3508

## 2018-06-26 NOTE — ED NOTES
Bed search: Currently no beds at [facility], [facility], and [facility]  Bed search to resume in AM by rica Castro conducting bed search

## 2018-06-26 NOTE — ED NOTES
Pt changed into paper scrubs and gave urine sample  Pt belongings collected  Dr Kamille Brito at bedside        Fahad Garcia  06/25/18 6233

## 2018-06-26 NOTE — ED CARE HANDOFF
Emergency Department Sign Out Note        Sign out and transfer of care from Dr Koroma [place]  See Separate Emergency Department note  The patient, Ike Coto, was evaluated by the previous provider for depression  Workup Completed: Follow-up admission process patient accepted at [facility]    ED Course / Workup Pending (followup): Patient was given his daily dose of methadone  95 mg                             Procedures  MDM  CritCare Time      Disposition  Final diagnoses:   Depression     Time reflects when diagnosis was documented in both MDM as applicable and the Disposition within this note     Time User Action Codes Description Comment    6/26/2018  1:04 PM Macario Hicks Add [F32 9] Depression       ED Disposition     ED Disposition Condition Comment    Transfer to [address] [place] [address]        Follow-up Information    None       Patient's Medications   Discharge Prescriptions    No medications on file     No discharge procedures on file         ED Provider  Electronically Signed by     Jenniffer Victoria DO  06/26/18 5640

## 2018-06-26 NOTE — ED NOTES
Ordered food tray  Pt sitting in room watching TV  No problems at this time        Naty Hopson  06/26/18 1134

## 2018-06-26 NOTE — ED NOTES
CM received call from [facility] admissions  [facility] stated that they are still waiting on some information from [facility], but they are in contact with them and patient has been formally accepted  [facility] has requested that transport be arranged for after 3 pm      CM faxed medical necessity form to [facility] and called and spoke with Nicky Teresa to arrange for transport  Nicky Teresa stated that there is no current availability until after midnight, but that she will call CM back within 30 minutes with an update  Patient made aware of admission  CM will inform patient of updated transport time  CM will continue to follow through discharge

## 2018-06-26 NOTE — ED NOTES
Insurance Authorization:   Phone call placed to [facility]  Phone number: [phone]  Spoke to Shyla Saez  3 days approved    Level of care: Inpatient Mental Health  Review on tbd  Authorization # call upon arrival

## 2018-06-26 NOTE — ED NOTES
Patient presented with suicidal thoughts no plan  Patient states he has had increased depression recently due to feelings of helplessness, "not feeling like myself", and racing thoughts  Patient was very vague  When asked if he could give one example of a stressor patient just repeated "stress" but was unable to narrow down what the stress was from  Patient has been on Methadone for 5 years at [facility] in [facility]  Last heroin use 3-4 years ago  Patient states he was hospitalized about ay ear ago at [facility] for depression/SI and was set up with a therapist/psychiatrist but after 6 months he lost his car and was unable to get transportation there, which they were unable to fill his scripts  When asked if patient felt being off his medications exacerbated the depression again, patient stated he had no idea  Patient states he is currently sleeping on a couch at his grandmother house for his living situation  Patient stated he was "trying" to work at [employer] full time  Patient states he has insurance  Patient denies any homicidal ideations but wanted it noted that he feels that recently he has had a short fuse  Patient  Denied auditory and visual hallucinations

## 2018-06-26 NOTE — ED NOTES
Spoke to [employer] RN at [address] [place] [address]  Pt  Does take methadone 95 mg daily  Last dose 6/25/18       Torrie Cai RN  06/26/18 9971

## 2019-12-06 NOTE — ED NOTES
Faxed referral to  and [facility] for review per [facility] request  Last OV with PCP 6/13/19.  Last refill for Alprazolam: 11/18/19  Last refill for Adderall: 11/7/19

## 2020-07-07 ENCOUNTER — OFFICE VISIT (OUTPATIENT)
Dept: URGENT CARE | Facility: CLINIC | Age: 30
End: 2020-07-07
Payer: COMMERCIAL

## 2020-07-07 VITALS
OXYGEN SATURATION: 97 % | TEMPERATURE: 97.5 F | HEIGHT: 66 IN | WEIGHT: 140 LBS | BODY MASS INDEX: 22.5 KG/M2 | RESPIRATION RATE: 16 BRPM | HEART RATE: 73 BPM

## 2020-07-07 DIAGNOSIS — Z11.59 SCREENING FOR VIRAL DISEASE: Primary | ICD-10-CM

## 2020-07-07 DIAGNOSIS — R06.02 SHORTNESS OF BREATH: ICD-10-CM

## 2020-07-07 PROCEDURE — G0381 LEV 2 HOSP TYPE B ED VISIT: HCPCS | Performed by: PHYSICIAN ASSISTANT

## 2020-07-07 PROCEDURE — U0003 INFECTIOUS AGENT DETECTION BY NUCLEIC ACID (DNA OR RNA); SEVERE ACUTE RESPIRATORY SYNDROME CORONAVIRUS 2 (SARS-COV-2) (CORONAVIRUS DISEASE [COVID-19]), AMPLIFIED PROBE TECHNIQUE, MAKING USE OF HIGH THROUGHPUT TECHNOLOGIES AS DESCRIBED BY CMS-2020-01-R: HCPCS | Performed by: PHYSICIAN ASSISTANT

## 2020-07-07 NOTE — LETTER
July 7, 2020     Patient: Elva Toribio   YOB: 1990   Date of Visit: 7/7/2020       To Whom it May Concern:    Rayna Driscoll is under my professional care  He was seen in my office on 7/7/2020  He should not return to work until he receives a negative test results and is fever and symptom free  If you have any questions or concerns, please don't hesitate to call  Sincerely,          Roscoe Souza PA-C        CC: Lefty Moreno

## 2020-07-07 NOTE — PATIENT INSTRUCTIONS
Car side evaluation and COVID-19 swab performed  Our office will call you with your test results  In the meantime, you should self isolate at home until you receive the results  If positive, you should remain on self-quarantine until 7 days after the time of the initial symptoms onset OR 72 hours after resolution of fever (without antipyretics) and symptoms  Proceed to  ER if symptoms worsen  [address]    Your healthcare provider and/or public health staff have evaluated you and have determined that you do not need to remain in the hospital at this time  At this time you can be isolated at home where you will be monitored by staff from your local or state health department  You should carefully follow the prevention and isolation steps below until a healthcare provider or local or state health department says that you can return to your normal activities  Stay home except to get medical care    People who are mildly ill with COVID-19 are able to isolate at home during their illness  You should restrict activities outside your home, except for getting medical care  Do not go to work, school, or public areas  Avoid using public transportation, ride-sharing, or taxis  Separate yourself from other people and animals in your home    People: As much as possible, you should stay in a specific room and away from other people in your home  Also, you should use a separate bathroom, if available  Animals: You should restrict contact with pets and other animals while you are sick with COVID-19, just like you would around other people  Although there have not been reports of pets or other animals becoming sick with COVID-19, it is still recommended that people sick with COVID-19 limit contact with animals until more information is known about the virus  When possible, have another member of your household care for your animals while you are sick   If you are sick with COVID-19, avoid contact with your pet, including petting, snuggling, being kissed or licked, and sharing food  If you must care for your pet or be around animals while you are sick, wash your hands before and after you interact with pets and wear a facemask  See COVID-19 and Animals for more information  Call ahead before visiting your doctor    If you have a medical appointment, call the healthcare provider and tell them that you have or may have COVID-19  This will help the healthcare providers office take steps to keep other people from getting infected or exposed  Wear a facemask    You should wear a facemask when you are around other people (e g , sharing a room or vehicle) or pets and before you enter a healthcare providers office  If you are not able to wear a facemask (for example, because it causes trouble breathing), then people who live with you should not stay in the same room with you, or they should wear a facemask if they enter your room  Cover your coughs and sneezes    Cover your mouth and nose with a tissue when you cough or sneeze  Throw used tissues in a lined trash can  Immediately wash your hands with soap and water for at least 20 seconds or, if soap and water are not available, clean your hands with an alcohol-based hand  that contains at least 60% alcohol  Clean your hands often    Wash your hands often with soap and water for at least 20 seconds, especially after blowing your nose, coughing, or sneezing; going to the bathroom; and before eating or preparing food  If soap and water are not readily available, use an alcohol-based hand  with at least 60% alcohol, covering all surfaces of your hands and rubbing them together until they feel dry  Soap and water are the best option if hands are visibly dirty  Avoid touching your eyes, nose, and mouth with unwashed hands      Avoid sharing personal household items    You should not share dishes, drinking glasses, cups, eating utensils, towels, or bedding with other people or pets in your home  After using these items, they should be washed thoroughly with soap and water  Clean all high-touch surfaces everyday    High touch surfaces include counters, tabletops, doorknobs, bathroom fixtures, toilets, phones, keyboards, tablets, and bedside tables  Also, clean any surfaces that may have blood, stool, or body fluids on them  Use a household cleaning spray or wipe, according to the label instructions  Labels contain instructions for safe and effective use of the cleaning product including precautions you should take when applying the product, such as wearing gloves and making sure you have good ventilation during use of the product  Monitor your symptoms    Seek prompt medical attention if your illness is worsening (e g , difficulty breathing)  Before seeking care, call your healthcare provider and tell them that you have, or are being evaluated for, COVID-19  Put on a facemask before you enter the facility  These steps will help the healthcare providers office to keep other people in the office or waiting room from getting infected or exposed  Ask your healthcare provider to call the local or [facility] health department  Persons who are placed under active monitoring or facilitated self-monitoring should follow instructions provided by their local health department or occupational health professionals, as appropriate  If you have a medical emergency and need to call 911, notify the dispatch personnel that you have, or are being evaluated for COVID-19  If possible, put on a facemask before emergency medical services arrive      Discontinuing home isolation    Patients with confirmed COVID-19 should remain under home isolation precautions until the following conditions are met:   - They have had no fever for at least 72 hours (that is three full days of no fever without the use medicine that reduces fevers)  AND  - other symptoms have improved (for example, when their cough or shortness of breath have improved)  AND  - at least 10 days have passed since their symptoms first appeared  Patients with confirmed COVID-19 should also notify close contacts (including their workplace) and ask that they self-quarantine  Currently, close contact is defined as being within 6 feet for 10 minutes or more from the period 48 hours before symptom onset to the time at which the patient went into isolation  Close contacts of patients diagnosed with COVID-19 should be instructed by the patient to self-quarantine for 14 days from the last time of their last contact with the patient       Source: [employer]

## 2020-07-07 NOTE — PROGRESS NOTES
[address] [employer] [facility]        NAME: Jj Henderson is a 26 y o  male  : 1990    MRN: [id number]  DATE: 2020  TIME: 2:26 PM    Assessment and Plan   Screening for viral disease [Z11 59]  1  Screening for viral disease  MISC COVID-19 TEST- Office Collection   2  Shortness of breath           Patient Instructions   Car side evaluation and COVID-19 swab performed  Our office will call you with your test results  In the meantime, you should self isolate at home until you receive the results  If positive, you should remain on self-quarantine until 7 days after the time of the initial symptoms onset OR 72 hours after resolution of fever (without antipyretics) and symptoms  Proceed to  ER if symptoms worsen  Chief Complaint     Chief Complaint   Patient presents with    COVID-19     Patient states headaches and diarrhea x 7 days  Vomited yesterday  Has had direct contact with COVID positive          History of Present Illness   Patient is a 38-year-old male who presents for COVID-19 testing  Known exposure to a COVID positive patient  For the past week he has had a headache  Positive fatigue  He is an every day vapor but states that he has had more shortness of breath than usual   Positive cough  Negative fever or chills  Positive diarrhea  Positive nausea with vomiting X 1  Negative myalgias  HPI    Review of Systems   Review of Systems   Constitutional: Positive for activity change, appetite change and fatigue  Negative for chills and fever  HENT: Negative for congestion, ear discharge, ear pain, facial swelling, mouth sores, postnasal drip, rhinorrhea, sinus pressure, sinus pain, sneezing, sore throat and trouble swallowing  Eyes: Negative for pain, discharge, redness and itching  Respiratory: Positive for cough and shortness of breath  Negative for apnea, chest tightness, wheezing and stridor  Cardiovascular: Negative for chest pain     Gastrointestinal: Positive for diarrhea, nausea and vomiting  Negative for abdominal distention and abdominal pain  Genitourinary: Negative for difficulty urinating  Musculoskeletal: Negative for arthralgias and myalgias  Skin: Negative for pallor and rash  Allergic/Immunologic: Negative  Neurological: Positive for headaches  Negative for dizziness and light-headedness  Hematological: Negative  Psychiatric/Behavioral: Negative  All other systems reviewed and are negative  Current Medications       Current Outpatient Medications:     methadone (DOLOPHINE) 10 MG/5ML solution, Take 95 mg by mouth daily, Disp: , Rfl:     Current Allergies     Allergies as of 07/07/2020 - Reviewed 07/07/2020   Allergen Reaction Noted    Augmentin [amoxicillin-pot clavulanate]  02/15/2018            The following portions of the patient's history were reviewed and updated as appropriate: allergies, current medications, past family history, past medical history, past social history, past surgical history and problem list      Past Medical History:   Diagnosis Date    ADD (attention deficit disorder)     ADHD (attention deficit hyperactivity disorder)     Anxiety     Depression     Psychiatric disorder     depression       History reviewed  No pertinent surgical history  History reviewed  No pertinent family history  Medications have been verified  Objective   Pulse 73   Temp 97 5 °F (36 4 °C)   Resp 16   Ht 5' 6" (1 676 m)   Wt 63 5 kg (140 lb) Comment: pt reported  SpO2 97%   BMI 22 60 kg/m²        Physical Exam     Physical Exam   Constitutional: Vital signs are normal  He appears well-developed and well-nourished  Non-toxic appearance  He does not have a sickly appearance  He appears ill  No distress  HENT:   Head: Normocephalic and atraumatic  Right Ear: External ear normal    Left Ear: External ear normal    Nose: Nose normal    Mouth/Throat: Oropharynx is clear and moist  No oropharyngeal exudate     Eyes: Pupils are equal, round, and reactive to light  Conjunctivae and EOM are normal  Right eye exhibits no discharge  Left eye exhibits no discharge  No scleral icterus  Cardiovascular: Normal rate, regular rhythm and normal heart sounds  Exam reveals no gallop and no friction rub  No murmur heard  Pulmonary/Chest: Effort normal and breath sounds normal  No stridor  No tachypnea  No respiratory distress  He has no decreased breath sounds  He has no wheezes  He has no rhonchi  He has no rales  He exhibits no tenderness  Abdominal: Soft  Bowel sounds are normal  He exhibits no distension  There is no tenderness  There is no guarding  Neurological: He is alert  Skin: Skin is warm and dry  No rash noted  He is not diaphoretic  No erythema  No pallor  Psychiatric: He has a normal mood and affect  His behavior is normal    Nursing note and vitals reviewed

## 2020-07-16 ENCOUNTER — TELEPHONE (OUTPATIENT)
Dept: URGENT CARE | Facility: CLINIC | Age: 30
End: 2020-07-16

## 2020-07-16 LAB — SARS-COV-2 RNA SPEC QL NAA+PROBE: NOT DETECTED

## 2025-02-18 ENCOUNTER — APPOINTMENT (OUTPATIENT)
Dept: URGENT CARE | Facility: MEDICAL CENTER | Age: 35
End: 2025-02-18